# Patient Record
Sex: MALE | Race: BLACK OR AFRICAN AMERICAN | NOT HISPANIC OR LATINO | URBAN - METROPOLITAN AREA
[De-identification: names, ages, dates, MRNs, and addresses within clinical notes are randomized per-mention and may not be internally consistent; named-entity substitution may affect disease eponyms.]

---

## 2023-04-24 ENCOUNTER — HOSPITAL ENCOUNTER (EMERGENCY)
Facility: HOSPITAL | Age: 48
Discharge: HOME | End: 2023-04-24
Attending: EMERGENCY MEDICINE
Payer: MEDICAID

## 2023-04-24 VITALS
TEMPERATURE: 98 F | OXYGEN SATURATION: 99 % | SYSTOLIC BLOOD PRESSURE: 141 MMHG | RESPIRATION RATE: 21 BRPM | BODY MASS INDEX: 24.25 KG/M2 | WEIGHT: 160 LBS | HEART RATE: 89 BPM | DIASTOLIC BLOOD PRESSURE: 87 MMHG | HEIGHT: 68 IN

## 2023-04-24 DIAGNOSIS — K92.1 HEMATOCHEZIA: Primary | ICD-10-CM

## 2023-04-24 LAB
ABO + RH BLD: NORMAL
ALBUMIN SERPL BCP-MCNC: 4.2 G/DL (ref 3.5–5.2)
ALP SERPL-CCNC: 76 U/L (ref 55–135)
ALT SERPL W/O P-5'-P-CCNC: 44 U/L (ref 10–44)
ANION GAP SERPL CALC-SCNC: 12 MMOL/L (ref 8–16)
APTT PPP: 23.3 SEC (ref 21–32)
AST SERPL-CCNC: 58 U/L (ref 10–40)
BASOPHILS # BLD AUTO: 0.04 K/UL (ref 0–0.2)
BASOPHILS NFR BLD: 0.5 % (ref 0–1.9)
BILIRUB SERPL-MCNC: 0.2 MG/DL (ref 0.1–1)
BLD GP AB SCN CELLS X3 SERPL QL: NORMAL
BUN SERPL-MCNC: 13 MG/DL (ref 6–20)
CALCIUM SERPL-MCNC: 9.8 MG/DL (ref 8.7–10.5)
CHLORIDE SERPL-SCNC: 102 MMOL/L (ref 95–110)
CO2 SERPL-SCNC: 26 MMOL/L (ref 23–29)
CREAT SERPL-MCNC: 1 MG/DL (ref 0.5–1.4)
DIFFERENTIAL METHOD: ABNORMAL
EOSINOPHIL # BLD AUTO: 0.3 K/UL (ref 0–0.5)
EOSINOPHIL NFR BLD: 3.6 % (ref 0–8)
ERYTHROCYTE [DISTWIDTH] IN BLOOD BY AUTOMATED COUNT: 13.6 % (ref 11.5–14.5)
EST. GFR  (NO RACE VARIABLE): >60 ML/MIN/1.73 M^2
GLUCOSE SERPL-MCNC: 136 MG/DL (ref 70–110)
HCT VFR BLD AUTO: 40.9 % (ref 40–54)
HGB BLD-MCNC: 14 G/DL (ref 14–18)
IMM GRANULOCYTES # BLD AUTO: 0.02 K/UL (ref 0–0.04)
IMM GRANULOCYTES NFR BLD AUTO: 0.2 % (ref 0–0.5)
INR PPP: 0.9 (ref 0.8–1.2)
LYMPHOCYTES # BLD AUTO: 2.7 K/UL (ref 1–4.8)
LYMPHOCYTES NFR BLD: 33.8 % (ref 18–48)
MAGNESIUM SERPL-MCNC: 1.6 MG/DL (ref 1.6–2.6)
MCH RBC QN AUTO: 31.6 PG (ref 27–31)
MCHC RBC AUTO-ENTMCNC: 34.2 G/DL (ref 32–36)
MCV RBC AUTO: 92 FL (ref 82–98)
MONOCYTES # BLD AUTO: 1.1 K/UL (ref 0.3–1)
MONOCYTES NFR BLD: 13.2 % (ref 4–15)
NEUTROPHILS # BLD AUTO: 3.9 K/UL (ref 1.8–7.7)
NEUTROPHILS NFR BLD: 48.7 % (ref 38–73)
NRBC BLD-RTO: 0 /100 WBC
PLATELET # BLD AUTO: 291 K/UL (ref 150–450)
PMV BLD AUTO: 9.7 FL (ref 9.2–12.9)
POTASSIUM SERPL-SCNC: 3.7 MMOL/L (ref 3.5–5.1)
PROT SERPL-MCNC: 8.1 G/DL (ref 6–8.4)
PROTHROMBIN TIME: 10 SEC (ref 9–12.5)
RBC # BLD AUTO: 4.43 M/UL (ref 4.6–6.2)
SODIUM SERPL-SCNC: 140 MMOL/L (ref 136–145)
SPECIMEN OUTDATE: NORMAL
TROPONIN I SERPL DL<=0.01 NG/ML-MCNC: <0.006 NG/ML (ref 0–0.03)
WBC # BLD AUTO: 8.01 K/UL (ref 3.9–12.7)

## 2023-04-24 PROCEDURE — 93010 ELECTROCARDIOGRAM REPORT: CPT | Mod: ,,, | Performed by: INTERNAL MEDICINE

## 2023-04-24 PROCEDURE — 85025 COMPLETE CBC W/AUTO DIFF WBC: CPT | Performed by: PHYSICIAN ASSISTANT

## 2023-04-24 PROCEDURE — 96374 THER/PROPH/DIAG INJ IV PUSH: CPT

## 2023-04-24 PROCEDURE — 93005 ELECTROCARDIOGRAM TRACING: CPT

## 2023-04-24 PROCEDURE — 84484 ASSAY OF TROPONIN QUANT: CPT | Performed by: PHYSICIAN ASSISTANT

## 2023-04-24 PROCEDURE — 83735 ASSAY OF MAGNESIUM: CPT | Performed by: PHYSICIAN ASSISTANT

## 2023-04-24 PROCEDURE — 85730 THROMBOPLASTIN TIME PARTIAL: CPT | Performed by: PHYSICIAN ASSISTANT

## 2023-04-24 PROCEDURE — 86900 BLOOD TYPING SEROLOGIC ABO: CPT | Performed by: PHYSICIAN ASSISTANT

## 2023-04-24 PROCEDURE — C9113 INJ PANTOPRAZOLE SODIUM, VIA: HCPCS | Performed by: PHYSICIAN ASSISTANT

## 2023-04-24 PROCEDURE — 99284 EMERGENCY DEPT VISIT MOD MDM: CPT | Mod: 25

## 2023-04-24 PROCEDURE — 85610 PROTHROMBIN TIME: CPT | Performed by: PHYSICIAN ASSISTANT

## 2023-04-24 PROCEDURE — 93010 EKG 12-LEAD: ICD-10-PCS | Mod: ,,, | Performed by: INTERNAL MEDICINE

## 2023-04-24 PROCEDURE — 80053 COMPREHEN METABOLIC PANEL: CPT | Performed by: PHYSICIAN ASSISTANT

## 2023-04-24 PROCEDURE — 63600175 PHARM REV CODE 636 W HCPCS: Performed by: PHYSICIAN ASSISTANT

## 2023-04-24 RX ORDER — PANTOPRAZOLE SODIUM 40 MG/10ML
40 INJECTION, POWDER, LYOPHILIZED, FOR SOLUTION INTRAVENOUS
Status: COMPLETED | OUTPATIENT
Start: 2023-04-24 | End: 2023-04-24

## 2023-04-24 RX ADMIN — PANTOPRAZOLE SODIUM 40 MG: 40 INJECTION, POWDER, LYOPHILIZED, FOR SOLUTION INTRAVENOUS at 05:04

## 2023-04-24 NOTE — ED TRIAGE NOTES
Pt presents to ER with c/o rectal bleeding times 3 days. Pt denies any other s/s at this time. Pt denies taking any meds. Pt denies pain. Pt AAOx4.

## 2023-04-24 NOTE — Clinical Note
"Fabio Frairek" Srinivas was seen and treated in our emergency department on 4/24/2023.  He may return to work on 04/26/2023.       If you have any questions or concerns, please don't hesitate to call.      Ellen Mckeon, RN RN    "

## 2023-04-24 NOTE — ED PROVIDER NOTES
Encounter Date: 4/24/2023       History     Chief Complaint   Patient presents with    Rectal Bleeding     Patient arrives with complaint of blood in stool, ongoing for the past few days. Was on toilet paper when he wiped at first, but is now also in toilet. Denies weakness, nausea, pain.     49yo M smoker presents to ED with chief complaint blood in stool.    Pt states began on Friday with blood after wiping after BM. States over the weekend began with BRB mixed in with stool. Early this AM around 4am had a BM, moderate amount bright blood in toilet, prompting ED visit. No straining or firm BMs. No hx constipation. No rectal pain. No hx hemorrhoids. No hx anal fissure. No abdominal pain. No n/v. No change in appetite or intake. No hematuria. No hx bleeding disorder. No hx GERD. Occasional Excedrin use, no frequent ASA or NSAID use. Denies SOB or ARAUZ. No fatigue. No syncope or near syncope. No CP or SOB. Symptoms acute. No alleviating or exacerbating factors. No radiation of symptoms.     PMH:  HTN    Review of patient's allergies indicates:  No Known Allergies  Past Medical History:   Diagnosis Date    Hypertension      History reviewed. No pertinent surgical history.  History reviewed. No pertinent family history.  Social History     Tobacco Use    Smoking status: Every Day     Types: Cigarettes    Smokeless tobacco: Never   Substance Use Topics    Alcohol use: Yes    Drug use: Never     Review of Systems   Constitutional:  Negative for fever.   Respiratory:  Negative for shortness of breath.    Cardiovascular:  Negative for chest pain.   Gastrointestinal:  Positive for blood in stool. Negative for abdominal pain, constipation, diarrhea, nausea, rectal pain and vomiting.   Genitourinary:  Negative for hematuria.   Musculoskeletal:  Negative for back pain.   Neurological:  Negative for syncope.     Physical Exam     Initial Vitals [04/24/23 0522]   BP Pulse Resp Temp SpO2   (!) 138/95 96 16 98.3 °F (36.8 °C) 98 %       MAP       --         Physical Exam    Nursing note and vitals reviewed.  Constitutional: He appears well-developed and well-nourished. He is not diaphoretic. No distress.   HENT:   Head: Normocephalic and atraumatic.   Neck: Neck supple.   Normal range of motion.  Pulmonary/Chest: No respiratory distress.   Abdominal: Abdomen is soft. Bowel sounds are normal. He exhibits no distension. There is no abdominal tenderness.   Genitourinary: Rectum:      Guaiac result positive.   Guaiac positive stool. : Acceptable.   Genitourinary Comments: ALISA: skin tag vs external hemorrhoid, nontender, no active bleeding. No anal fissure. No firm mass of stool to distal rectum. Bartholomew stool, hemoccult positive.      Musculoskeletal:      Cervical back: Normal range of motion and neck supple.     Neurological: He is alert and oriented to person, place, and time. GCS score is 15. GCS eye subscore is 4. GCS verbal subscore is 5. GCS motor subscore is 6.   Skin: Skin is warm. Capillary refill takes less than 2 seconds.   Psychiatric: He has a normal mood and affect. Thought content normal.       ED Course   Procedures  Labs Reviewed   CBC W/ AUTO DIFFERENTIAL - Abnormal; Notable for the following components:       Result Value    RBC 4.43 (*)     MCH 31.6 (*)     Mono # 1.1 (*)     All other components within normal limits   COMPREHENSIVE METABOLIC PANEL - Abnormal; Notable for the following components:    Glucose 136 (*)     AST 58 (*)     All other components within normal limits   PROTIME-INR   APTT   MAGNESIUM   TROPONIN I   TROPONIN I   TYPE & SCREEN     EKG Readings: (Independently Interpreted)   Sinus tachycardia, ventricular rate 90 beats per minute.  Normal IN, normal QT.  No right axis deviation.  Biphasic T-wave lead 3.  Questionable intraventricular block pattern with normal QRS duration.  Q-waves to inferior, lateral leads.  Nonspecific ST segment changes, unsure if true ST elevation versus early  repolarization.  No previous comparison.     Imaging Results    None          Medications   pantoprazole injection 40 mg (40 mg Intravenous Given 4/24/23 0556)     Medical Decision Making:   Differential Diagnosis:   Lower GI bleed, anal fissure, hemorrhoid, diverticulitis  Clinical Tests:   Lab Tests: Ordered  Radiological Study: Ordered  Medical Tests: Ordered and Reviewed  ED Management:  Denies hx GI bleed.     Troponin added given abnormal EKG. No cardiac complaints. No personal hx ACS. +HTN. No HLD, no DM. +smoker.           Attending Attestation:     Physician Attestation Statement for NP/PA:   I have conducted a face to face encounter with this patient in addition to the NP/PA, due to NP/PA Request    Other NP/PA Attestation Additions:      Medical Decision Making: Pt signed out to me at 0600 pending labs. Labs stable. No Bowel movements while in the ED. Benign abdominal exam. Per Midlevel pt had no hemorrhoid nor fissure nor abnormality on ALISA.  Pt has normal Vital signs. Given age and Tobacco and Alcohol misuse pt need colonoscopy. I contacted Endoscopy who states they will place a case request. Return precautions given. Pt advised to stop smoking and drinking. Pt denies ASA or NSAIDS or blood thinners.            ED Course as of 04/24/23 0823   Mon Apr 24, 2023   0608 Hemoglobin: 14.0 [SM]      ED Course User Index  [SM] Artie Valero PA-C                 Clinical Impression:   Final diagnoses:  [K92.1] Hematochezia (Primary)        ED Disposition Condition    Discharge Stable          ED Prescriptions    None       Follow-up Information       Follow up With Specialties Details Why Contact [facility] Emergency Dept Emergency Medicine  As needed, If symptoms worsen or new concerns. [address]  [facility] [address] [phone]    Ricardo Fernandez MD Gastroenterology Schedule an appointment as soon as possible for a visit  they should also be calling you to schedule. If you do  not hear from them by wednesday. Please call them. [address]  [facility] [address]  [phone]               Melvin Van MD  04/24/23 0883

## 2023-05-01 ENCOUNTER — TELEPHONE (OUTPATIENT)
Dept: ENDOSCOPY | Facility: HOSPITAL | Age: 48
End: 2023-05-01
Payer: MEDICAID

## 2023-05-01 NOTE — TELEPHONE ENCOUNTER
----- Message from Vipul Ortiz sent at 4/28/2023 11:43 AM CDT -----  Regarding: appt  Contact: [phone]  Pt just went to ed on 4/24 ... states he had blood and stool and they wanted him to sched a colonoscopy... pls call and adv@[phone]... leave a message cause at work

## 2023-07-07 ENCOUNTER — HOSPITAL ENCOUNTER (EMERGENCY)
Facility: HOSPITAL | Age: 48
Discharge: HOME | End: 2023-07-07
Attending: EMERGENCY MEDICINE
Payer: MEDICAID

## 2023-07-07 VITALS
HEIGHT: 68 IN | WEIGHT: 160 LBS | BODY MASS INDEX: 24.25 KG/M2 | RESPIRATION RATE: 20 BRPM | SYSTOLIC BLOOD PRESSURE: 149 MMHG | HEART RATE: 92 BPM | TEMPERATURE: 98 F | OXYGEN SATURATION: 100 % | DIASTOLIC BLOOD PRESSURE: 86 MMHG

## 2023-07-07 DIAGNOSIS — R74.01 ELEVATED AST (SGOT): ICD-10-CM

## 2023-07-07 DIAGNOSIS — R74.8 ELEVATED CPK: ICD-10-CM

## 2023-07-07 DIAGNOSIS — R25.2 MUSCLE CRAMPS: ICD-10-CM

## 2023-07-07 DIAGNOSIS — R07.9 CHEST PAIN: Primary | ICD-10-CM

## 2023-07-07 LAB
ALBUMIN SERPL BCP-MCNC: 4.2 G/DL (ref 3.5–5.2)
ALP SERPL-CCNC: 87 U/L (ref 55–135)
ALT SERPL W/O P-5'-P-CCNC: 38 U/L (ref 10–44)
ANION GAP SERPL CALC-SCNC: 14 MMOL/L (ref 8–16)
AST SERPL-CCNC: 105 U/L (ref 10–40)
BASOPHILS # BLD AUTO: 0.04 K/UL (ref 0–0.2)
BASOPHILS NFR BLD: 0.3 % (ref 0–1.9)
BILIRUB SERPL-MCNC: 0.4 MG/DL (ref 0.1–1)
BNP SERPL-MCNC: <10 PG/ML (ref 0–99)
BUN SERPL-MCNC: 15 MG/DL (ref 6–20)
CALCIUM SERPL-MCNC: 9.9 MG/DL (ref 8.7–10.5)
CHLORIDE SERPL-SCNC: 101 MMOL/L (ref 95–110)
CK SERPL-CCNC: 206 U/L (ref 20–200)
CO2 SERPL-SCNC: 24 MMOL/L (ref 23–29)
CREAT SERPL-MCNC: 0.8 MG/DL (ref 0.5–1.4)
DIFFERENTIAL METHOD: ABNORMAL
EOSINOPHIL # BLD AUTO: 0.4 K/UL (ref 0–0.5)
EOSINOPHIL NFR BLD: 2.9 % (ref 0–8)
ERYTHROCYTE [DISTWIDTH] IN BLOOD BY AUTOMATED COUNT: 14.1 % (ref 11.5–14.5)
EST. GFR  (NO RACE VARIABLE): >60 ML/MIN/1.73 M^2
GLUCOSE SERPL-MCNC: 72 MG/DL (ref 70–110)
HCT VFR BLD AUTO: 43.7 % (ref 40–54)
HGB BLD-MCNC: 14.5 G/DL (ref 14–18)
IMM GRANULOCYTES # BLD AUTO: 0.05 K/UL (ref 0–0.04)
IMM GRANULOCYTES NFR BLD AUTO: 0.4 % (ref 0–0.5)
LYMPHOCYTES # BLD AUTO: 2.2 K/UL (ref 1–4.8)
LYMPHOCYTES NFR BLD: 16.6 % (ref 18–48)
MCH RBC QN AUTO: 31.3 PG (ref 27–31)
MCHC RBC AUTO-ENTMCNC: 33.2 G/DL (ref 32–36)
MCV RBC AUTO: 94 FL (ref 82–98)
MONOCYTES # BLD AUTO: 1.3 K/UL (ref 0.3–1)
MONOCYTES NFR BLD: 9.8 % (ref 4–15)
NEUTROPHILS # BLD AUTO: 9.1 K/UL (ref 1.8–7.7)
NEUTROPHILS NFR BLD: 70 % (ref 38–73)
NRBC BLD-RTO: 0 /100 WBC
PLATELET # BLD AUTO: 337 K/UL (ref 150–450)
PMV BLD AUTO: 9.4 FL (ref 9.2–12.9)
POTASSIUM SERPL-SCNC: 4.2 MMOL/L (ref 3.5–5.1)
PROT SERPL-MCNC: 8.2 G/DL (ref 6–8.4)
RBC # BLD AUTO: 4.64 M/UL (ref 4.6–6.2)
SODIUM SERPL-SCNC: 139 MMOL/L (ref 136–145)
TROPONIN I SERPL DL<=0.01 NG/ML-MCNC: <0.006 NG/ML (ref 0–0.03)
WBC # BLD AUTO: 12.99 K/UL (ref 3.9–12.7)

## 2023-07-07 PROCEDURE — 99285 EMERGENCY DEPT VISIT HI MDM: CPT | Mod: 25

## 2023-07-07 PROCEDURE — 82550 ASSAY OF CK (CPK): CPT | Performed by: NURSE PRACTITIONER

## 2023-07-07 PROCEDURE — 93010 EKG 12-LEAD: ICD-10-PCS | Mod: ,,, | Performed by: INTERNAL MEDICINE

## 2023-07-07 PROCEDURE — 83880 ASSAY OF NATRIURETIC PEPTIDE: CPT | Performed by: NURSE PRACTITIONER

## 2023-07-07 PROCEDURE — 63600175 PHARM REV CODE 636 W HCPCS: Performed by: NURSE PRACTITIONER

## 2023-07-07 PROCEDURE — 93010 ELECTROCARDIOGRAM REPORT: CPT | Mod: ,,, | Performed by: INTERNAL MEDICINE

## 2023-07-07 PROCEDURE — 25000003 PHARM REV CODE 250: Performed by: NURSE PRACTITIONER

## 2023-07-07 PROCEDURE — 85025 COMPLETE CBC W/AUTO DIFF WBC: CPT | Performed by: NURSE PRACTITIONER

## 2023-07-07 PROCEDURE — 96360 HYDRATION IV INFUSION INIT: CPT

## 2023-07-07 PROCEDURE — 80053 COMPREHEN METABOLIC PANEL: CPT | Performed by: NURSE PRACTITIONER

## 2023-07-07 PROCEDURE — 84484 ASSAY OF TROPONIN QUANT: CPT | Performed by: NURSE PRACTITIONER

## 2023-07-07 PROCEDURE — 96361 HYDRATE IV INFUSION ADD-ON: CPT

## 2023-07-07 PROCEDURE — 80074 ACUTE HEPATITIS PANEL: CPT | Performed by: NURSE PRACTITIONER

## 2023-07-07 PROCEDURE — 93005 ELECTROCARDIOGRAM TRACING: CPT

## 2023-07-07 RX ORDER — METHOCARBAMOL 500 MG/1
1000 TABLET, FILM COATED ORAL EVERY 8 HOURS PRN
Qty: 18 TABLET | Refills: 0 | Status: SHIPPED | OUTPATIENT
Start: 2023-07-07 | End: 2023-07-10

## 2023-07-07 RX ORDER — ACETAMINOPHEN 325 MG/1
650 TABLET ORAL
Status: COMPLETED | OUTPATIENT
Start: 2023-07-07 | End: 2023-07-07

## 2023-07-07 RX ADMIN — SODIUM CHLORIDE, POTASSIUM CHLORIDE, SODIUM LACTATE AND CALCIUM CHLORIDE 1000 ML: 600; 310; 30; 20 INJECTION, SOLUTION INTRAVENOUS at 05:07

## 2023-07-07 RX ADMIN — SODIUM CHLORIDE, POTASSIUM CHLORIDE, SODIUM LACTATE AND CALCIUM CHLORIDE 1000 ML: 600; 310; 30; 20 INJECTION, SOLUTION INTRAVENOUS at 03:07

## 2023-07-07 RX ADMIN — ACETAMINOPHEN 650 MG: 325 TABLET ORAL at 03:07

## 2023-07-07 NOTE — ED PROVIDER NOTES
Encounter Date: 7/7/2023    SCRIBE #1 NOTE: I, Nicolas Fortune, am scribing for, and in the presence of,  CHIOMA Yanes. I have scribed the following portions of the note - Other sections scribed: HPI, ROS.     History     Chief Complaint   Patient presents with    Muscle Pain     PT to ER with reports of muscle spasms on left side of chest x months. Pt reports pain is works with movement and bending over. PT denies N/V, dizziness.     CC: Muscle spasm    HPI: Fabio Espino, a 48 y.o. male presents to the ED with intermittent muscle spasm to the left side of the chest onset this morning. Pt complains pain is aggravated when moving or bending over. Pt reports taking Excedrin today at 1200 with relief. 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 Pt denies any recent traumas or injuries. Pt notes hx of HTN and borderline DM. Pt reports he last saw his PCP for a regular check up 2 weeks ago. Pt denies chest pain, SOB, leg swelling, or other associated symptoms. Pt denies any known allergies.     The history is provided by the patient. No  was used.   Review of patient's allergies indicates:  No Known Allergies  Past Medical History:   Diagnosis Date    Hypertension     Prediabetes      History reviewed. No pertinent surgical history.  History reviewed. No pertinent family history.  Social History     Tobacco Use    Smoking status: Every Day     Types: Cigarettes    Smokeless tobacco: Never   Substance Use Topics    Alcohol use: Yes    Drug use: Never     Review of Systems   Constitutional:  Negative for chills and fever.   HENT:  Negative for congestion, ear discharge, ear pain, rhinorrhea, sore throat and trouble swallowing.    Eyes:  Negative for visual disturbance.   Respiratory:  Negative for cough and shortness of breath.    Cardiovascular:  Negative for chest pain and leg swelling.   Gastrointestinal:  Negative for abdominal pain, diarrhea, nausea and  "vomiting.   Genitourinary:  Negative for dysuria.   Musculoskeletal:  Positive for myalgias. Negative for back pain, neck pain and neck stiffness.   Skin:  Negative for color change, rash and wound.   Neurological:  Negative for seizures, syncope, speech difficulty, weakness and headaches.   Psychiatric/Behavioral:  Negative for confusion.      Physical Exam     Initial Vitals [07/07/23 1429]   BP Pulse Resp Temp SpO2   126/83 102 19 98.7 °F (37.1 °C) 99 %      MAP       --         Vitals:    07/07/23 1429 07/07/23 1438 07/07/23 1658 07/07/23 1824   BP: 126/83  138/86 (!) 149/86   Pulse: 102 98 88 92   Resp: 19  19 20   Temp: 98.7 °F (37.1 °C)  98.6 °F (37 °C) 98.2 °F (36.8 °C)   TempSrc: Oral  Oral Oral   SpO2: 99%  98% 100%   Weight: 72.6 kg (160 lb)      Height: 5' 8" (1.727 m)        Physical Exam    Nursing note and vitals reviewed.  Constitutional: He appears well-developed and well-nourished. He is not diaphoretic. He is cooperative.  Non-toxic appearance. He does not have a sickly appearance. He does not appear ill. No distress.   HENT:   Head: Normocephalic and atraumatic.   Right Ear: External ear normal.   Left Ear: External ear normal.   Nose: Nose normal.   Mouth/Throat: Oropharynx is clear and moist and mucous membranes are normal. No trismus in the jaw.   Eyes: Conjunctivae and EOM are normal. No scleral icterus.   Neck: Phonation normal.   Normal range of motion.  Cardiovascular:  Normal rate, regular rhythm, normal heart sounds and intact distal pulses.           No murmur heard.  Pulses:       Radial pulses are 2+ on the right side and 2+ on the left side.   Pulmonary/Chest: No tachypnea and no bradypnea. No respiratory distress. He has no wheezes. He has no rhonchi. He has no rales. He exhibits tenderness.     Abdominal: Abdomen is soft. He exhibits no distension. There is no abdominal tenderness. There is no rebound and no guarding.   Musculoskeletal:         General: Normal range of motion.      " Cervical back: Normal range of motion. No rigidity. Normal range of motion.     Neurological: He is alert and oriented to person, place, and time. No sensory deficit. He exhibits normal muscle tone. Coordination and gait normal. GCS score is 15. GCS eye subscore is 4. GCS verbal subscore is 5. GCS motor subscore is 6.   Skin: Skin is warm and dry. Capillary refill takes less than 2 seconds. No bruising and no rash noted. No erythema.   Psychiatric: He has a normal mood and affect. His behavior is normal. Judgment and thought content normal.       ED Course   Procedures  Labs Reviewed   CBC W/ AUTO DIFFERENTIAL - Abnormal; Notable for the following components:       Result Value    WBC 12.99 (*)     MCH 31.3 (*)     Gran # (ANC) 9.1 (*)     Immature Grans (Abs) 0.05 (*)     Mono # 1.3 (*)     Lymph % 16.6 (*)     All other components within normal limits   COMPREHENSIVE METABOLIC PANEL - Abnormal; Notable for the following components:     (*)     All other components within normal limits   CK - Abnormal; Notable for the following components:     (*)     All other components within normal limits   TROPONIN I   B-TYPE NATRIURETIC PEPTIDE   HEPATITIS PANEL, ACUTE   HEPATITIS PANEL, ACUTE     EKG Readings: (Independently Interpreted)   Initial Reading: No STEMI. Rhythm: Normal Sinus Rhythm. Axis: Normal. Other Impression: EKG reviewed with my attending, Dr. Bond.  No STEMI. Nonspecific STT wave abnormality similar to previous EKG from 24 April 2023.  Voltage criteria for LVH.   ECG Results              EKG 12-lead (Final result)  Result time 07/07/23 16:14:42      Final result by Interface, Lab In [facility] (07/07/23 16:14:42)                   Narrative:    Test Reason : R25.2,    Vent. Rate : 098 BPM     Atrial Rate : 098 BPM     P-R Int : 128 ms          QRS Dur : 084 ms      QT Int : 362 ms       P-R-T Axes : 069 054 064 degrees     QTc Int : 462 ms    Normal sinus rhythm  Minimal voltage criteria for  LVH, may be normal variant ( Sokolow-Moseley )  Nonspecific ST and T wave abnormality  Prolonged QT  Abnormal ECG  When compared with ECG of 24-APR-2023 05:43,  No significant change was found  Confirmed by Phani Pearce MD ([id number]) on 7/7/2023 4:14:26 PM    Referred By: SHARMILA   SELF           Confirmed By:Phani Pearce MD                                  Imaging Results              X-Ray Chest PA And Lateral (Final result)  Result time 07/07/23 14:52:03      Final result by Ramy Gastelum MD (07/07/23 14:52:03)                   Impression:      No acute chest disease identified.      Electronically signed by: Ramy Gastelum MD  Date:    07/07/2023  Time:    14:52               Narrative:    EXAMINATION:  XR CHEST PA AND LATERAL    CLINICAL HISTORY:  Chest Pain;    TECHNIQUE:  PA and lateral views of the chest were performed.    COMPARISON:  None    FINDINGS:  The cardiac silhouette and superior mediastinal structures are unremarkable. Pulmonary vasculature is within normal limits. The lungs are well aerated and free of focal consolidations. There is no evidence for pneumothorax or pleural effusions. Bony structures are grossly intact.                                       Medications   lactated ringers bolus 1,000 mL (1,000 mLs Intravenous New Bag 7/7/23 1731)   lactated ringers bolus 1,000 mL (0 mLs Intravenous Stopped 7/7/23 1613)   acetaminophen tablet 650 mg (650 mg Oral Given 7/7/23 1500)           APC / Resident Notes:   This is an evaluation of a 48 y.o. male that presents to the Emergency Department for muscle spasm/pain in the left lower chest. Physical Exam shows a non-toxic, afebrile, and well appearing male. Breath sounds clear and equal bilaterally, no increased work of breathing or respiratory distress. Heart sounds regular rate and rhythm. No murmurs.  Reproducible tenderness in left anterior lower ribs.  No crepitus.  Abdomen soft and non tender. No palpable masses or bruits. Equal upper extremity  pulses, no ripping chest pain to the back.  Calfs soft without pain.  Vital Signs Are Reassuring. If available, previous records reviewed. RESULTS:  CBC with mildly elevated white count at 12.99.  CMP with an elevated AST at 105.  Negative troponin, negative BNP.  CPK mildly elevated at 206 however this is after 1 L fluid.  CXR negative for infiltrates, pneumothorax, cardiomegaly, pleural effusion or widening of the mediastinum. No evidence of hypoxia. EKG normal sinus rhythm rate of 90 beats per minute.  Voltage criteria for LVH.  ST T wave changes, suspect early repol on discussed with my attending.    My overall impression is chest pain/muscle cramp in the chest with elevated CPK and AST.  I considered but doubt acute myocardial infarction, Boerhaeve syndrome, cardiac tamponade, thoracic artery dissection, acute coronary syndrome, pneumothorax, pneumonia, CHF, cardiac arrhythmia, or any other emergent cardiac, esophageal, pulmonary or aortic pathology.  Considered but doubt pulmonary embolism based on patient's symptoms and exam.  No findings suspect significant rhabdomyolysis requiring admission.    ED Course: 2L IVF's, resolution of symptoms. D/C Meds:  Robaxin. D/C Information:  Encouraged follow up with primary care for recheck of symptoms including AST, advised avoid Tylenol and alcohol. The diagnosis, treatment plan, instructions for follow-up and reevaluation with primary care as well as ED return precautions were discussed and understanding was verbalized. All questions or concerns have been addressed. This case was discussed with Dr. Bond who is in agreement with my assessment and plan. JUSTIN Robbins, FNP-C   Scribe Attestation:   Scribe #1: I performed the above scribed service and the documentation accurately describes the services I performed. I attest to the accuracy of the note.      ED Course as of 07/07/23 1809 Fri Jul 07, 2023   1625 Reassessment:  Patient reports pain resolved.  Spoke  with lab, they need additional blood as the CPK and CMP are hemolyzed.  I have nose by nursing staff to collect additional samples. [AF]   1648 Redrawn blood is been sent to the laboratory.  Patient has completed the IV fluids at this point. [AF]      ED Course User Index  [AF] CHIOMA Starks               Scribe attestation: IJONAS APRN, ASHLEY, personally performed the services described in this documentation.  All medical record entries made by the scribe were at my direction and in my presence.  I have reviewed the chart and agree that the record reflects my personal performance and is accurate and complete.    Clinical Impression:   Final diagnoses:  [R25.2] Muscle cramps  [R07.9] Chest pain (Primary)  [R74.8] Elevated CPK  [R74.01] Elevated AST (SGOT)        ED Disposition Condition    Discharge Stable          ED Prescriptions       Medication Sig Dispense Start Date End Date Auth. Provider    methocarbamoL (ROBAXIN) 500 MG Tab Take 2 tablets (1,000 mg total) by mouth every 8 (eight) hours as needed (Muscle Spasm/Pain). 18 tablet 7/7/2023 7/10/2023 CHIOMA Starks          Follow-up Information       Follow up With Specialties Details Why Contact Info    Your Primary Care Doctor  Schedule an appointment as soon as possible for a visit  Please call and schedule an appointment for follow up this week.     [facility] Emergency Dept Emergency Medicine Go to  If symptoms worsen [address] [place] [id number] [phone]             CHIOMA Starks  07/07/23 7636

## 2023-07-07 NOTE — DISCHARGE INSTRUCTIONS
§ Please return to the Emergency Department for any new or worsening symptoms including: fever, chest pain, shortness of breath, loss of consciousness, dizziness, weakness, or any other concerns.     § Schedule an appointment for follow up with your Primary Care Doctor as soon as possible for a recheck of your symptoms. If you do not have one, contact the one listed on your discharge paperwork or call the [facility] Appointment Desk at [phone] to schedule an appointment.     § Please take all medication as prescribed. Please stay hydrated.     Your liver function test was elevated (ALT). Please avoid tylenol and alcohol until you see your primary care doctor for a recheck.

## 2023-07-07 NOTE — Clinical Note
"Fabio Espino (Roderick) was seen and treated in our emergency department on 7/7/2023.  He may return to work on 07/10/2023.       If you have any questions or concerns, please don't hesitate to call.       RN    "

## 2023-07-07 NOTE — ED NOTES
Muscle cramps over epigastric region >1month. Pt describes the pain as a muscle spasm that gets worse with movement. Pt states hx of HTN and DM and taking medication but is unable to recall name or dosage. Pt denies n/v, dizziness

## 2023-07-08 LAB
HAV IGM SERPL QL IA: NORMAL
HBV CORE IGM SERPL QL IA: NORMAL
HBV SURFACE AG SERPL QL IA: NORMAL
HCV AB SERPL QL IA: NORMAL

## 2024-05-29 NOTE — Clinical Note
"Fabio Espino (Roderick) was seen and treated in our emergency department on 7/7/2023.  He may return to work on 07/10/2023.       If you have any questions or concerns, please don't hesitate to call.       RN    " Spontaneous, unlabored and symmetrical